# Patient Record
Sex: MALE | URBAN - METROPOLITAN AREA
[De-identification: names, ages, dates, MRNs, and addresses within clinical notes are randomized per-mention and may not be internally consistent; named-entity substitution may affect disease eponyms.]

---

## 2021-01-28 ENCOUNTER — POST OP (OUTPATIENT)
Dept: URBAN - METROPOLITAN AREA CLINIC 44 | Facility: CLINIC | Age: 81
End: 2021-01-28
Payer: COMMERCIAL

## 2021-01-28 PROCEDURE — 99024 POSTOP FOLLOW-UP VISIT: CPT | Performed by: OPHTHALMOLOGY

## 2021-03-11 ENCOUNTER — POST OP (OUTPATIENT)
Dept: URBAN - METROPOLITAN AREA CLINIC 44 | Facility: CLINIC | Age: 81
End: 2021-03-11
Payer: COMMERCIAL

## 2021-03-11 DIAGNOSIS — Z48.817 ENCOUNTER FOR SURGICAL AFTERCARE FOLLOWING SURGERY ON THE SKIN AND SUBCUTANEOUS TISSUE: Primary | ICD-10-CM

## 2021-03-11 PROCEDURE — 99024 POSTOP FOLLOW-UP VISIT: CPT | Performed by: OPHTHALMOLOGY

## 2021-06-03 ENCOUNTER — POST-OPERATIVE VISIT (OUTPATIENT)
Dept: URBAN - METROPOLITAN AREA CLINIC 44 | Facility: CLINIC | Age: 81
End: 2021-06-03
Payer: COMMERCIAL

## 2021-06-03 DIAGNOSIS — Z48.89 ENCOUNTER FOR OTHER SPECIFIED SURGICAL AFTERCARE: Primary | ICD-10-CM

## 2021-06-03 PROCEDURE — 99024 POSTOP FOLLOW-UP VISIT: CPT | Performed by: OPHTHALMOLOGY

## 2021-06-03 NOTE — IMPRESSION/PLAN
Impression:  Encounter for other specified surgical aftercare  Z48.89. Plan: intermittent tearing per patient but not bothered by it. mild medial ectropion on exam. RL puncta irrigated, 923% canalicular obstruction. Upper canaliculus is patent. Given that patient is not bothered by intermittent tearing, lid is in overall good position, and vision is stable, I do not recommend ectropion repair of rios tube placement at this time. All questions answered. RTC PRN. I am happy to see Jenna Boehringer back at any time.